# Patient Record
Sex: MALE | Race: WHITE | NOT HISPANIC OR LATINO | URBAN - METROPOLITAN AREA
[De-identification: names, ages, dates, MRNs, and addresses within clinical notes are randomized per-mention and may not be internally consistent; named-entity substitution may affect disease eponyms.]

---

## 2023-12-07 ENCOUNTER — OFFICE VISIT (OUTPATIENT)
Dept: URGENT CARE | Facility: CLINIC | Age: 63
End: 2023-12-07
Payer: COMMERCIAL

## 2023-12-07 VITALS
TEMPERATURE: 98 F | SYSTOLIC BLOOD PRESSURE: 147 MMHG | RESPIRATION RATE: 16 BRPM | HEIGHT: 72 IN | HEART RATE: 67 BPM | DIASTOLIC BLOOD PRESSURE: 89 MMHG | OXYGEN SATURATION: 100 % | WEIGHT: 225 LBS | BODY MASS INDEX: 30.48 KG/M2

## 2023-12-07 DIAGNOSIS — M54.50 ACUTE BILATERAL LOW BACK PAIN WITHOUT SCIATICA: Primary | ICD-10-CM

## 2023-12-07 PROCEDURE — 96372 THER/PROPH/DIAG INJ SC/IM: CPT | Mod: S$GLB,,, | Performed by: PHYSICIAN ASSISTANT

## 2023-12-07 PROCEDURE — 99203 OFFICE O/P NEW LOW 30 MIN: CPT | Mod: 25,S$GLB,, | Performed by: PHYSICIAN ASSISTANT

## 2023-12-07 PROCEDURE — 99203 PR OFFICE/OUTPT VISIT, NEW, LEVL III, 30-44 MIN: ICD-10-PCS | Mod: 25,S$GLB,, | Performed by: PHYSICIAN ASSISTANT

## 2023-12-07 PROCEDURE — 96372 PR INJECTION,THERAP/PROPH/DIAG2ST, IM OR SUBCUT: ICD-10-PCS | Mod: S$GLB,,, | Performed by: PHYSICIAN ASSISTANT

## 2023-12-07 RX ORDER — KETOROLAC TROMETHAMINE 30 MG/ML
30 INJECTION, SOLUTION INTRAMUSCULAR; INTRAVENOUS
Status: COMPLETED | OUTPATIENT
Start: 2023-12-07 | End: 2023-12-07

## 2023-12-07 RX ORDER — CYCLOBENZAPRINE HCL 10 MG
10 TABLET ORAL 3 TIMES DAILY PRN
Qty: 15 TABLET | Refills: 0 | Status: SHIPPED | OUTPATIENT
Start: 2023-12-07 | End: 2023-12-07 | Stop reason: SDUPTHER

## 2023-12-07 RX ORDER — CYCLOBENZAPRINE HCL 10 MG
10 TABLET ORAL 3 TIMES DAILY PRN
Qty: 15 TABLET | Refills: 0 | Status: SHIPPED | OUTPATIENT
Start: 2023-12-07 | End: 2023-12-12

## 2023-12-07 RX ADMIN — KETOROLAC TROMETHAMINE 30 MG: 30 INJECTION, SOLUTION INTRAMUSCULAR; INTRAVENOUS at 09:12

## 2023-12-07 NOTE — PROGRESS NOTES
"Subjective:      Patient ID: Will Muñiz is a 63 y.o. male.    Vitals:  height is 6' (1.829 m) and weight is 102.1 kg (225 lb). His temporal temperature is 98 °F (36.7 °C). His blood pressure is 147/89 (abnormal) and his pulse is 67. His respiration is 16 and oxygen saturation is 100%.     Chief Complaint: Back Pain    Lower back pain that started yesterday   Patient states he bent over to take a bite of soup and his back "locked up"   Patient did take Tylenol and Advil last night, nothing today     Back Pain  This is a new problem. The current episode started yesterday. The problem occurs intermittently. The problem has been gradually worsening since onset. The pain is present in the lumbar spine. The pain is at a severity of 4/10. The pain is mild. The symptoms are aggravated by standing, bending and twisting. Pertinent negatives include no abdominal pain, bladder incontinence, bowel incontinence, chest pain, dysuria, fever, headaches, leg pain, numbness, paresis, paresthesias, pelvic pain, perianal numbness, tingling, weakness or weight loss. Treatments tried: Tylenol/Advil. The treatment provided mild relief.       Constitution: Negative for fever.   Cardiovascular:  Negative for chest pain.   Gastrointestinal:  Negative for abdominal pain and bowel incontinence.   Genitourinary:  Negative for dysuria, bladder incontinence and pelvic pain.   Musculoskeletal:  Positive for back pain.   Neurological:  Negative for headaches and numbness.      Objective:     Physical Exam   Constitutional: He does not appear ill. No distress.   HENT:   Head: Normocephalic and atraumatic.   Ears:   Right Ear: External ear normal.   Left Ear: External ear normal.   Eyes: Conjunctivae are normal. Right eye exhibits no discharge. Left eye exhibits no discharge. Extraocular movement intact   Cardiovascular: Normal rate, regular rhythm and normal heart sounds.   No murmur heard.  Pulmonary/Chest: Effort normal. No respiratory distress. "   Abdominal: Normal appearance.   Musculoskeletal: Normal range of motion.         General: Normal range of motion.      Comments: No tenderness palpation midline over lumbar spine.  5/5 strength in bilateral lower extremities.  Normal sensation bilateral lower extremities.  Negative straight leg raise bilaterally.   Neurological: He is alert.   Skin: Skin is warm, dry and not pale. jaundice  Psychiatric: His behavior is normal. Mood, judgment and thought content normal.   Nursing note and vitals reviewed.      Assessment:     1. Acute bilateral low back pain without sciatica        Plan:       Acute bilateral low back pain without sciatica    Other orders  -     ketorolac injection 30 mg  -     cyclobenzaprine (FLEXERIL) 10 MG tablet; Take 1 tablet (10 mg total) by mouth 3 (three) times daily as needed for Muscle spasms.  Dispense: 15 tablet; Refill: 0      Low Back Pain in Adults   The Basics   Written by the doctors and editors at [employer]   How worried should I be about low back pain? -- Do not assume the worst. Almost everyone gets back pain at some point. Low back pain can be scary. But even when the pain is severe, it usually goes away on its own within a few weeks. The cases that require urgent care or surgery are rare.  See your doctor or nurse if you have back pain and you:  Recently had a fall or an injury to your back  Have numbness or weakness in your legs  Have problems with bladder or bowel control  Have unexplained weight loss  Have a fever or feel sick in other ways  Take steroid medicine, such as prednisone, on a regular basis  Have diabetes or a medical problem that weakens your immune system  Have a history of cancer or osteoporosis  You should also see a doctor if:  Your back pain is so severe that you cannot perform simple tasks  Your back pain does not start to improve within 4 weeks   What are the parts of the back? -- The back is made up of (figure 1):  Vertebrae - A stack of bones that sit  "on top of one another like a stack of coins. Each of these bones has a hole in the center. When stacked, the bones form a hollow tube that protects the spinal cord.  Discs - Rubbery discs sit in between each of the vertebrae to add cushion and allow movement.  Spinal cord and nerves - The spinal cord is the highway of nerves that connects the brain to the rest of the body. It runs through the vertebrae within the spinal canal. Nerves branch from the spinal cord and pass in between the vertebrae. From there they connect to the arms, the legs, and the organs. (This is why problems in the back can cause leg pain or bladder or bowel problems.)  Muscles, tendons, and ligaments - Together the muscles, tendons, and ligaments are called the "soft tissues" of the back. These soft tissues support the back and help hold it together.  What causes low back pain? -- Many different things can cause low back pain. Most of the time, doctors do not know the exact cause.  Back pain can happen if you strain a muscle. This is often what has happened when a person "throws out" their back. This refers to pain that starts suddenly after physical activity, like lifting something heavy or bending the back.  Back pain can also happen if you have:  Damaged, bulging, or torn discs  Arthritis affecting the joints of the spine  Bony growths on the vertebrae that crowd nearby nerves  A vertebra out of place  Narrowing in the spinal canal  A tumor or infection (but this is very rare)  Should I get an imaging test? -- Most people do not need an imaging test such an X-ray, CT scan, or MRI. Most cases of back pain go away a few weeks. Doctors usually do not order imaging tests unless there are signs of something unusual.  If your doctor does not order an imaging test, do not worry. They can still learn a lot about your pain just from looking you over and talking with you.  How can the doctor or nurse tell what is wrong just by talking to me? -- Your " "symptoms tell your doctor or nurse a lot about the cause of your pain. For example:  If your pain started after you did something specific, like lifting a heavy object or twisting your back, you might have strained a muscle.  If your pain spreads down the back of one thigh, it could be a sign that one of the nerves that go to your leg is being pinched by a bulging or torn disc.  If your pain goes all the way down both legs, it could be a sign that you have a narrowed spinal canal. This is most often due to bony growths on your spine.  How is back pain treated? -- Most people with an episode of low back pain do not have a serious medical problem, and can try simple treatments such as:  Staying active - The best thing you can do is to stay as active as possible. People with low back pain recover faster if they stay active. If your pain is severe, you might need to rest for a day or 2. But it's important to get back to walking and moving as soon as possible. While you should avoid heavy lifting and sports while your back hurts, try to keep doing your normal daily activities.  Heat - Some people find that it helps to use a heating pad or heated wrap. Be careful to avoid high heat settings to prevent skin burns.  Medicines - First, you can try pain medicines that you can get without a prescription. In many cases, doctors suggest first trying a nonsteroidal antiinflammatory drug, or "NSAID." NSAIDs include ibuprofen (sample brand names: Advil, Motrin) and naproxen (sample brand name: Aleve). These might work better than acetaminophen (Tylenol) for back pain.  If non-prescription medicines do not help, let your doctor or nurse know. In some cases, doctors prescribe a medicine to relax the muscles (called a "muscle relaxant"). But keep in mind that muscle relaxants are not generally used in people older than 65. In older people, these medicines can cause side effects such as trouble urinating or confusion.  Treatments to " "help with symptoms - Some treatments might help you feel better for a little while. They include:  Spinal manipulation - This is when a chiropractor, physical therapist, or other professional moves or "adjusts" the joints of your back. If you want to try this, talk to your doctor or nurse first.  Acupuncture - This is when someone who knows traditional Chinese medicine inserts tiny needles into your body to block pain signals.  Massage  While back pain usually goes away within a few weeks, some people do continue to have pain for longer. In this case, additional treatments might include:  Self care - This involves being aware of your pain. While you should rest when you need to, it's important to stay active as much as you can. Things like applying heat and doing gentle stretches can help you feel better, too.  Physical therapy - A physical therapist is an exercise expert who can teach you stretches and movements to help strengthen your muscles. The goal is to relieve pain but also help you get back to your normal activities.   Exercises you can try include walking, swimming, or using an exercise bike. Some people also find that Tan Chi or yoga can help with their back pain. Finding activities you enjoy can help you stay active.  Reducing stress - Some people find that it helps to try something called "mindfulness-based stress reduction." This involves going to a group program to practice relaxation and meditation. If your back pain is making you feel anxious or depressed, talk to your doctor or nurse. There are other treatments that can help with these problems.  Some people wonder if injections (shots) can help to relieve back pain. In some cases, doctors might recommend a shot of medicine to numb the area or reduce swelling. But this has only been proven to work in specific situations.  Only a small number of people end up needing surgery to treat back pain.  What can I do to keep from getting back pain again? -- " The best thing you can do is to stay active. Doing exercises to strengthen and stretch your back can help. You can also:  Learn to lift using your legs instead of your back  Avoid sitting or standing in the same position for too long  Having back pain can be frustrating and scary. But it can help to know that doing these things can lower your risk of having another episode.  All topics are updated as new evidence becomes available and our peer review process is complete.  This topic retrieved from [employer] on: Sep 21, 2021.  Topic 85556 Version 18.0  Release: 29.4.2 - C29.263  © 2021 [employer], Inc. and/or its affiliates. All rights reserved.  figure 1: Anatomy of the back     Low back pain can be caused by problems with the muscles, ligaments, discs, bones (vertebrae), or nerves. Often, back pain is caused by strains or sprains involving the muscles or ligaments. These problems cannot always be seen on imaging tests, such as MRI or CT scans.  Graphic 86534 Version 5.0     Consumer Information Use and Disclaimer   This information is not specific medical advice and does not replace information you receive from your health care provider. This is only a brief summary of general information. It does NOT include all information about conditions, illnesses, injuries, tests, procedures, treatments, therapies, discharge instructions or life-style choices that may apply to you. You must talk with your health care provider for complete information about your health and treatment options. This information should not be used to decide whether or not to accept your health care provider's advice, instructions or recommendations. Only your health care provider has the knowledge and training to provide advice that is right for you. The use of this information is governed by the [employer] End User License Agreement, available at [email or web address]/[email or web address]/[email or web address].The use of [employer] content is governed by  the [employer] Terms of Use. ©2021 [employer] All rights reserved.  Copyright   © 2021 [employer] and/or its affiliates. All rights reserved.